# Patient Record
Sex: FEMALE | Race: WHITE
[De-identification: names, ages, dates, MRNs, and addresses within clinical notes are randomized per-mention and may not be internally consistent; named-entity substitution may affect disease eponyms.]

---

## 2019-01-14 ENCOUNTER — HOSPITAL ENCOUNTER (EMERGENCY)
Dept: HOSPITAL 25 - ED | Age: 54
Discharge: HOME | End: 2019-01-14
Payer: COMMERCIAL

## 2019-01-14 VITALS — SYSTOLIC BLOOD PRESSURE: 117 MMHG | DIASTOLIC BLOOD PRESSURE: 71 MMHG

## 2019-01-14 DIAGNOSIS — Z88.8: ICD-10-CM

## 2019-01-14 DIAGNOSIS — Z88.1: ICD-10-CM

## 2019-01-14 DIAGNOSIS — M26.69: Primary | ICD-10-CM

## 2019-01-14 DIAGNOSIS — J32.2: ICD-10-CM

## 2019-01-14 DIAGNOSIS — Z88.2: ICD-10-CM

## 2019-01-14 PROCEDURE — 96372 THER/PROPH/DIAG INJ SC/IM: CPT

## 2019-01-14 PROCEDURE — 70486 CT MAXILLOFACIAL W/O DYE: CPT

## 2019-01-14 PROCEDURE — 99282 EMERGENCY DEPT VISIT SF MDM: CPT

## 2019-05-07 NOTE — ED
Throat Pain/Nasal Congestion





- HPI Summary


HPI Summary: 


This patient is a 53 year old F presenting to [facility] accompanied by  with 

a chief complaint of L lower dental pain radiating to L ear that began 

yesterday. The patient rates the pain 7/10 in severity. Symptoms aggravated by 

swallowing. Symptoms alleviated by sitting up.








- History of Current Complaint


Chief Complaint: EDDentalPain


Time Seen by Provider: 01/14/19 03:38


Hx Obtained From: Patient


Onset/Duration: Sudden Onset, Lasting Days, Still Present


Severity: Moderate


Cough: None





- Allergies/Home Medications


Allergies/Adverse Reactions: 


 Allergies











Allergy/AdvReac Type Severity Reaction Status Date / Time


 


MS Celecoxib [From Celebrex] Allergy  Swelling Verified 01/14/19 03:33





   Of  





   Face,Lips,&  





   Throat  


 


MS CI Pigment Blue 63 Allergy  Swelling Verified 01/14/19 03:33





[From Cymbalta]     


 


MS Clarithromycin Allergy  Unknown Verified 01/14/19 03:33





[From Biaxin]   Reaction  





   Details  


 


MS Duloxetine [From Cymbalta] Allergy  Swelling Verified 01/14/19 03:33


 


MS Pregabalin [From Lyrica] Allergy  Swelling Verified 01/14/19 03:33


 


MS Sulfa Antibiotics Allergy  Eyes Verified 01/14/19 03:33





[Sulfa Antibiotics]   Itchy/Swollen/Red/Watery  














PMH/Surg Hx/FS Hx/Imm Hx


Previously Healthy: No


Endocrine/Hematology History: Reports: Hx Thyroid Disease


   Denies: Hx Diabetes


Cardiovascular History: 


   Denies: Hx Hypertension, Hx Pacemaker/ICD


Respiratory History: Reports: Hx Asthma


Sensory History: 


   Denies: Hx Hearing Aid


Psychiatric History: 


   Denies: Hx Panic Disorder





- Cancer History


Hx Chemotherapy: No


Hx Radiation Therapy: No





- Surgical History


Surgery Procedure, Year, and Place: LT KNEE TORN MEDIAL MENISCUS X2


Infectious Disease History: No


Infectious Disease History: 


   Denies: Traveled Outside the [place] in Last 30 Days





- Family History


Known Family History: Positive: Unknown - Patient is adobted





- Social History


Occupation: Employed Full-time


Lives: With Family


Alcohol Use: None


Hx Substance Use: No


Substance Use Type: Reports: None





Review of Systems


Negative: Fever


Positive: Dental Pain


All Other Systems Reviewed And Are Negative: Yes





Physical Exam





- Summary


Physical Exam Summary: 


VITAL SIGNS: Reviewed.


GENERAL: Patient is a well-developed and nourished female who is lying 

comfortable in the stretcher. Patient is not in any acute respiratory distress.


HEAD AND FACE: No signs of trauma. No ecchymosis, hematomas or skull 

depressions. No sinus tenderness.


EYES: PERRLA, EOMI x 2, No injected conjunctiva, no nystagmus.


EARS: Hearing grossly intact. Ear canals and tympanic membranes are within 

normal limits.


MOUTH: Oropharynx within normal limits. Tenderness over the right TMJ. Able to 

bite on the tongue depressor but she is pain. No salivation. She is able to 

talk.


NECK: Supple, trachea is midline, no adenopathy, no JVD, no carotid bruit, no c-

spine tenderness, neck with full ROM.


CHEST: Symmetric, no tenderness at palpation


LUNGS: Clear to auscultation bilaterally. No wheezing or crackles.


CVS: Regular rate and rhythm, S1 and S2 present, no murmurs or gallops 

appreciated.


ABDOMEN: Soft, non-tender. No signs of distention. No rebound no guarding, and 

no masses palpated. Bowel sounds are normal.


EXTREMITIES: FROM in all major joints, no edema, no cyanosis or clubbing.


NEURO: Alert and oriented x 3. No acute neurological deficits. Speech is normal 

and follows commands.


SKIN: Dry and warm





Triage Information Reviewed: Yes


Vital Signs On Initial Exam: 


 Initial Vitals











Temp Pulse Resp BP Pulse Ox


 


 97.8 F   86   18   115/72   98 


 


 01/14/19 03:31  01/14/19 03:31  01/14/19 03:31  01/14/19 03:31  01/14/19 03:31











Vital Signs Reviewed: Yes





Diagnostics





- Vital Signs


 Vital Signs











  Temp Pulse Resp BP Pulse Ox


 


 01/14/19 03:31  97.8 F  86  18  115/72  98














- Laboratory


Lab Statement: Any lab studies that have been ordered have been reviewed, and 

results considered in the medical decision making process.





- CT


  ** Maxillofacial CT


CT Interpretation Completed By: Radiologist


Summary of CT Findings: Maxillofacial CT reveals, per radiologist, 1. 

Degenerative changes of the TMJs with slight asymmetric anterior translation of 

the right mandibular condyle compared to the left. 2. Absence of the crown of 

tooth #19. 3. Minimal ethmoid sinus disease. 4. Otherwise negative CT facial 

bones. ED physician has reviewed this radiology report.





EENT Course/Dx





- Course


Course Of Treatment: This patient is a 53 year old F presenting to [facility] 

accompanied by  with a chief complaint of L lower dental pain radiating 

to L ear that began yesterday. Physical Exam Findings: Tenderness over the 

right TNJ. Able to bite on the tongue depressor but she is pain. No salivation. 

She is able to talk. Maxillofacial CT reveals, per radiologist, 1. Degenerative 

changes of the TMJs with slight asymmetric anterior translation of the right 

mandibular condyle compared to the left. 2. Absence of the crown of tooth #19. 

3. Minimal ethmoid sinus disease. 4. Otherwise negative CT facial bones. In the 

ED course the patient was given Toradol and Ativan. Patient will be discharged 

with follow up from PCP. The patient is agreeable with this plan.





- Diagnoses


Provider Diagnoses: 


 TMJ arthritis








Discharge





- Sign-Out/Discharge


Documenting (check all that apply): Patient Departure - Discharge home





- Discharge Plan


Condition: Stable


Disposition: HOME


Prescriptions: 


Ibuprofen TAB* [Motrin TAB* 800 MG] 800 mg PO Q6H PRN #30 tab


 PRN Reason: Pain


oxyCODONE/Acetamin 5/325 MG* [Percocet 5/325 TAB*] 1 tab PO Q6H PRN #14 tab MDD 

4


 PRN Reason: Pain


Patient Education Materials:  Arthritis (ED)


Referrals: 


Carmen Garcia MD [Primary Care Provider] - 2 Days


Additional Instructions: 


RETURN TO THE EMERGENCY DEPARTMENT FOR NEW OR WORSENING SYMPTOMS





FOLLOW UP WITH A TMJ DOCTOR IN 2-3 DAYS





- Attestation Statements


Document Initiated by Scribe: Yes


Documenting Scribe: Nancy Stewart


Provider For Whom Scribe is Documenting (Include Credential): Dr. Nenita Whyte MD


Scribe Attestation: 


Nancy LANTIGUA scribed for Dr. Nenita Whyte MD on 01/14/19 at 0515. 


Status of Scribe Document: Ready Primary Defect Length In Cm (Final Defect Size - Required For Flaps/Grafts): 1.2